# Patient Record
Sex: FEMALE | Race: WHITE | NOT HISPANIC OR LATINO | Employment: FULL TIME | URBAN - METROPOLITAN AREA
[De-identification: names, ages, dates, MRNs, and addresses within clinical notes are randomized per-mention and may not be internally consistent; named-entity substitution may affect disease eponyms.]

---

## 2025-04-14 ENCOUNTER — APPOINTMENT (OUTPATIENT)
Dept: CARDIOLOGY | Facility: HOSPITAL | Age: 20
End: 2025-04-14

## 2025-04-14 PROCEDURE — 99285 EMERGENCY DEPT VISIT HI MDM: CPT | Performed by: EMERGENCY MEDICINE

## 2025-04-14 PROCEDURE — 93005 ELECTROCARDIOGRAM TRACING: CPT

## 2025-04-14 ASSESSMENT — COLUMBIA-SUICIDE SEVERITY RATING SCALE - C-SSRS
6. HAVE YOU EVER DONE ANYTHING, STARTED TO DO ANYTHING, OR PREPARED TO DO ANYTHING TO END YOUR LIFE?: NO
2. HAVE YOU ACTUALLY HAD ANY THOUGHTS OF KILLING YOURSELF?: NO
1. IN THE PAST MONTH, HAVE YOU WISHED YOU WERE DEAD OR WISHED YOU COULD GO TO SLEEP AND NOT WAKE UP?: NO

## 2025-04-14 ASSESSMENT — PAIN SCALES - GENERAL: PAINLEVEL_OUTOF10: 8

## 2025-04-14 ASSESSMENT — PAIN - FUNCTIONAL ASSESSMENT: PAIN_FUNCTIONAL_ASSESSMENT: 0-10

## 2025-04-15 ENCOUNTER — APPOINTMENT (OUTPATIENT)
Dept: RADIOLOGY | Facility: HOSPITAL | Age: 20
End: 2025-04-15

## 2025-04-15 ENCOUNTER — APPOINTMENT (OUTPATIENT)
Dept: CARDIOLOGY | Facility: HOSPITAL | Age: 20
End: 2025-04-15

## 2025-04-15 ENCOUNTER — HOSPITAL ENCOUNTER (OUTPATIENT)
Facility: HOSPITAL | Age: 20
Setting detail: OBSERVATION
Discharge: HOME | End: 2025-04-15
Attending: EMERGENCY MEDICINE | Admitting: INTERNAL MEDICINE
Payer: MEDICAID

## 2025-04-15 VITALS
BODY MASS INDEX: 14.3 KG/M2 | DIASTOLIC BLOOD PRESSURE: 70 MMHG | WEIGHT: 89 LBS | SYSTOLIC BLOOD PRESSURE: 113 MMHG | HEIGHT: 66 IN | TEMPERATURE: 99.3 F | RESPIRATION RATE: 18 BRPM | OXYGEN SATURATION: 100 % | HEART RATE: 83 BPM

## 2025-04-15 DIAGNOSIS — J93.83 PNEUMOTHORAX, ACUTE: Primary | ICD-10-CM

## 2025-04-15 DIAGNOSIS — R07.1 CHEST PAIN ON BREATHING: ICD-10-CM

## 2025-04-15 LAB
ALBUMIN SERPL BCP-MCNC: 4.5 G/DL (ref 3.4–5)
ALP SERPL-CCNC: 86 U/L (ref 33–110)
ALT SERPL W P-5'-P-CCNC: 6 U/L (ref 7–45)
ANION GAP SERPL CALC-SCNC: 11 MMOL/L (ref 10–20)
AST SERPL W P-5'-P-CCNC: 11 U/L (ref 9–39)
B-HCG SERPL-ACNC: <2 MIU/ML
BASOPHILS # BLD AUTO: 0.07 X10*3/UL (ref 0–0.1)
BASOPHILS NFR BLD AUTO: 0.8 %
BILIRUB SERPL-MCNC: 0.4 MG/DL (ref 0–1.2)
BUN SERPL-MCNC: 10 MG/DL (ref 6–23)
CALCIUM SERPL-MCNC: 9.8 MG/DL (ref 8.6–10.3)
CHLORIDE SERPL-SCNC: 104 MMOL/L (ref 98–107)
CO2 SERPL-SCNC: 26 MMOL/L (ref 21–32)
CREAT SERPL-MCNC: 0.78 MG/DL (ref 0.5–1.05)
EGFRCR SERPLBLD CKD-EPI 2021: >90 ML/MIN/1.73M*2
EOSINOPHIL # BLD AUTO: 0.03 X10*3/UL (ref 0–0.7)
EOSINOPHIL NFR BLD AUTO: 0.3 %
ERYTHROCYTE [DISTWIDTH] IN BLOOD BY AUTOMATED COUNT: 12.3 % (ref 11.5–14.5)
GLUCOSE SERPL-MCNC: 96 MG/DL (ref 74–99)
HCT VFR BLD AUTO: 39.9 % (ref 36–46)
HGB BLD-MCNC: 13.6 G/DL (ref 12–16)
IMM GRANULOCYTES # BLD AUTO: 0.02 X10*3/UL (ref 0–0.7)
IMM GRANULOCYTES NFR BLD AUTO: 0.2 % (ref 0–0.9)
LYMPHOCYTES # BLD AUTO: 2.16 X10*3/UL (ref 1.2–4.8)
LYMPHOCYTES NFR BLD AUTO: 23.9 %
MCH RBC QN AUTO: 30.5 PG (ref 26–34)
MCHC RBC AUTO-ENTMCNC: 34.1 G/DL (ref 32–36)
MCV RBC AUTO: 90 FL (ref 80–100)
MONOCYTES # BLD AUTO: 0.78 X10*3/UL (ref 0.1–1)
MONOCYTES NFR BLD AUTO: 8.6 %
NEUTROPHILS # BLD AUTO: 5.98 X10*3/UL (ref 1.2–7.7)
NEUTROPHILS NFR BLD AUTO: 66.2 %
NRBC BLD-RTO: 0 /100 WBCS (ref 0–0)
PLATELET # BLD AUTO: 317 X10*3/UL (ref 150–450)
POTASSIUM SERPL-SCNC: 3.8 MMOL/L (ref 3.5–5.3)
PROT SERPL-MCNC: 7.4 G/DL (ref 6.4–8.2)
RBC # BLD AUTO: 4.46 X10*6/UL (ref 4–5.2)
SODIUM SERPL-SCNC: 137 MMOL/L (ref 136–145)
WBC # BLD AUTO: 9 X10*3/UL (ref 4.4–11.3)

## 2025-04-15 PROCEDURE — 1210000001 HC SEMI-PRIVATE ROOM DAILY

## 2025-04-15 PROCEDURE — 36415 COLL VENOUS BLD VENIPUNCTURE: CPT | Performed by: EMERGENCY MEDICINE

## 2025-04-15 PROCEDURE — 71045 X-RAY EXAM CHEST 1 VIEW: CPT

## 2025-04-15 PROCEDURE — 84702 CHORIONIC GONADOTROPIN TEST: CPT | Performed by: EMERGENCY MEDICINE

## 2025-04-15 PROCEDURE — 80053 COMPREHEN METABOLIC PANEL: CPT | Performed by: EMERGENCY MEDICINE

## 2025-04-15 PROCEDURE — 71275 CT ANGIOGRAPHY CHEST: CPT

## 2025-04-15 PROCEDURE — 2550000001 HC RX 255 CONTRASTS: Performed by: EMERGENCY MEDICINE

## 2025-04-15 PROCEDURE — 71046 X-RAY EXAM CHEST 2 VIEWS: CPT

## 2025-04-15 PROCEDURE — 71275 CT ANGIOGRAPHY CHEST: CPT | Mod: FOREIGN READ | Performed by: RADIOLOGY

## 2025-04-15 PROCEDURE — 71046 X-RAY EXAM CHEST 2 VIEWS: CPT | Mod: FOREIGN READ | Performed by: RADIOLOGY

## 2025-04-15 PROCEDURE — G0378 HOSPITAL OBSERVATION PER HR: HCPCS

## 2025-04-15 PROCEDURE — 93005 ELECTROCARDIOGRAM TRACING: CPT

## 2025-04-15 PROCEDURE — 99222 1ST HOSP IP/OBS MODERATE 55: CPT | Performed by: STUDENT IN AN ORGANIZED HEALTH CARE EDUCATION/TRAINING PROGRAM

## 2025-04-15 PROCEDURE — 85025 COMPLETE CBC W/AUTO DIFF WBC: CPT | Performed by: EMERGENCY MEDICINE

## 2025-04-15 PROCEDURE — 71045 X-RAY EXAM CHEST 1 VIEW: CPT | Performed by: RADIOLOGY

## 2025-04-15 RX ORDER — ONDANSETRON 4 MG/1
4 TABLET, FILM COATED ORAL EVERY 8 HOURS PRN
Status: DISCONTINUED | OUTPATIENT
Start: 2025-04-15 | End: 2025-04-15 | Stop reason: HOSPADM

## 2025-04-15 RX ORDER — ONDANSETRON HYDROCHLORIDE 2 MG/ML
4 INJECTION, SOLUTION INTRAVENOUS EVERY 8 HOURS PRN
Status: DISCONTINUED | OUTPATIENT
Start: 2025-04-15 | End: 2025-04-15 | Stop reason: HOSPADM

## 2025-04-15 RX ORDER — OXYCODONE HYDROCHLORIDE 5 MG/1
5 TABLET ORAL EVERY 6 HOURS PRN
Status: DISCONTINUED | OUTPATIENT
Start: 2025-04-15 | End: 2025-04-15 | Stop reason: HOSPADM

## 2025-04-15 RX ORDER — PANTOPRAZOLE SODIUM 40 MG/1
40 TABLET, DELAYED RELEASE ORAL
Status: DISCONTINUED | OUTPATIENT
Start: 2025-04-15 | End: 2025-04-15 | Stop reason: HOSPADM

## 2025-04-15 RX ORDER — ACETAMINOPHEN 325 MG/1
650 TABLET ORAL EVERY 4 HOURS PRN
Status: DISCONTINUED | OUTPATIENT
Start: 2025-04-15 | End: 2025-04-15 | Stop reason: HOSPADM

## 2025-04-15 RX ORDER — ACETAMINOPHEN 650 MG/1
650 SUPPOSITORY RECTAL EVERY 4 HOURS PRN
Status: DISCONTINUED | OUTPATIENT
Start: 2025-04-15 | End: 2025-04-15 | Stop reason: HOSPADM

## 2025-04-15 RX ORDER — PANTOPRAZOLE SODIUM 40 MG/10ML
40 INJECTION, POWDER, LYOPHILIZED, FOR SOLUTION INTRAVENOUS
Status: DISCONTINUED | OUTPATIENT
Start: 2025-04-15 | End: 2025-04-15 | Stop reason: HOSPADM

## 2025-04-15 RX ORDER — ACETAMINOPHEN 160 MG/5ML
650 SOLUTION ORAL EVERY 4 HOURS PRN
Status: DISCONTINUED | OUTPATIENT
Start: 2025-04-15 | End: 2025-04-15 | Stop reason: HOSPADM

## 2025-04-15 RX ADMIN — IOHEXOL 50 ML: 350 INJECTION, SOLUTION INTRAVENOUS at 04:29

## 2025-04-15 ASSESSMENT — ENCOUNTER SYMPTOMS
HEMATOLOGIC/LYMPHATIC NEGATIVE: 1
SHORTNESS OF BREATH: 1
ACTIVITY CHANGE: 1
NEUROLOGICAL NEGATIVE: 1
ALLERGIC/IMMUNOLOGIC NEGATIVE: 1
FATIGUE: 1
GASTROINTESTINAL NEGATIVE: 1
PSYCHIATRIC NEGATIVE: 1
MUSCULOSKELETAL NEGATIVE: 1
EYES NEGATIVE: 1
ENDOCRINE NEGATIVE: 1

## 2025-04-15 ASSESSMENT — ACTIVITIES OF DAILY LIVING (ADL): LACK_OF_TRANSPORTATION: NO

## 2025-04-15 ASSESSMENT — PAIN SCALES - GENERAL
PAINLEVEL_OUTOF10: 0 - NO PAIN

## 2025-04-15 NOTE — ED PROVIDER NOTES
Chief Complaint   Patient presents with   • Shortness of Breath   • Chest Pain   • lung problems     HPI:   Maria Eugenia Cuevas is an 19 y.o. female with no known past medical history presenting to the ED for chief complaint of right-sided chest pain.  She explains that she was driving in the car earlier today when she experienced sudden onset right sided chest pain in her upper chest.  She explains that the pain is worse when she sits up straight and is relieved when she hunches over.  Explains she has pain when she takes a deep breath.  Denies any trauma.  No cough.  No shortness of breath.  No fevers.    No Known Allergies:  No past medical history on file.  Past Surgical History:   Procedure Laterality Date   • MYRINGOTOMY W/ TUBES  10/17/2016    Myringotomy - With Ventilating Tube Insertion     No family history on file.     Physical Exam  Vitals and nursing note reviewed.   Constitutional:       General: She is not in acute distress.     Appearance: She is well-developed.   HENT:      Head: Normocephalic and atraumatic.      Mouth/Throat:      Mouth: Mucous membranes are moist.      Pharynx: Oropharynx is clear.   Eyes:      Extraocular Movements: Extraocular movements intact.      Conjunctiva/sclera: Conjunctivae normal.      Pupils: Pupils are equal, round, and reactive to light.   Cardiovascular:      Rate and Rhythm: Normal rate and regular rhythm.      Heart sounds: No murmur heard.  Pulmonary:      Effort: Pulmonary effort is normal. No respiratory distress.      Breath sounds: Examination of the right-upper field reveals decreased breath sounds. Examination of the right-middle field reveals decreased breath sounds. Decreased breath sounds present. No wheezing or rhonchi.   Abdominal:      Palpations: Abdomen is soft.      Tenderness: There is no abdominal tenderness.   Musculoskeletal:         General: No swelling.      Cervical back: Normal range of motion and neck supple.   Skin:     General: Skin is  warm and dry.      Capillary Refill: Capillary refill takes less than 2 seconds.   Neurological:      Mental Status: She is alert.   Psychiatric:         Mood and Affect: Mood normal.      VS: As documented in the triage note and EMR flowsheet from this visit were reviewed.      Medical Decision Making: This is a 19-year-old with no known past medical history presenting to the ED for chief complaint of chest pain.  Patient explains she was driving her car earlier today when she experienced sudden onset right-sided chest pain in the upper part of her chest.  Explains that it was hard to take a deep breath and her pain was severe.  On exam initially her vitals are stable she is saturating at 98% on room air she is afebrile she is speaking full sentences she is obviously uncomfortable.  She is very thin.  Decreased lung sounds on the right upper and mid lobe.  Lung sounds on the left were clear.  Heart rate and rhythm was regular.  Her chest x-ray was suggestive of an 10% apical pneumothorax on the right side.  CT order was placed to investigate for any blebs.  She was placed on 100% oxygen and admitted to the hospital for observation and cardiothoracic consult.  ED Course as of 04/15/25 0526   Tue Apr 15, 2025   0429 EKG interpreted by myself.  Normal sinus rhythm at a rate of 87 bpm.  Normal intervals.  Normal axis.  No signs of acute ischemia.   [EF]      ED Course User Index  [EF] Malcolm Haddad MD         Diagnoses as of 04/15/25 0526   Pneumothorax, acute   Chest pain on breathing     Counseling: Spoke with the patient and discussed today´s findings, in addition to providing specific details for the plan of care and expected course.  Patient was given the opportunity to ask questions.    The plan of care was mutually agreed upon with the patient. The patient and/or family were given the opportunity to ask questions. All questions asked today in the ED were answered to the best of my ability with today's  information.    This report was transcribed using voice recognition software.  Every effort was made to ensure accuracy, however, inadvertently computerized transcription errors may be present.       Sylvester Randolph PA-C  04/15/25 0405       Sylvester Randolph PA-C  04/15/25 0555

## 2025-04-15 NOTE — ED NOTES
Community Resource Name: Patient was given HRS service to assist with getting insurance.  Phone Number:   Staff Member:  Blanquita Morse    Discussed the following topics on behalf of the patient:  []  Behavioral Health Assistance     []  Case Management  []   Assistance  []  Digital Equity Assistance  []  Dental Health Assistance  []  Education Assistance  []  Employment Assistance  []  Financial Strain Relief Assistance  []  Food Insecurity Assistance  [x]  Healthcare Coverage Assistance  []  Housing Stability Assistance  []  IP Violence Relief Assistance  []  Legal Assistance  []  Physical Activity Assistance  []  Social Connection Assistance  []  Stress Relief Assistance   []  Substance Abuse Assistance  []  Transportation Assistance  []  Utility Assistance  [x]  Other: [insert comment here]  Patient has no insurance.  Next Steps:         MECHE Jackman  Track patients for community healthcare services. CHW was informed by [facility] that patient doesn't have insurance. CHW talked to patient about [facility] service that has various program that can help with medical bills, insurance or no insurance issues. CHW gave patient the HRS form to complete and expressed that someone from [facility] will follow-up. Patient expressed appreciation.        MECHE Jackman  04/15/25 [id number]

## 2025-04-15 NOTE — PROGRESS NOTES
Transitional Care Coordination Progress Note:  Plan per Medical/Surgical team: treatment of pneumothorax with oxygen, pain management, thorasic surgery consult  Status: Inpatient   Payor source: self pay- HRS for assistance   Discharge disposition: Home with foster mother  Potential Barriers: ?need chest tube   ADOD: 4/17/2025   CECILIO Peacock RN, BSN Transitional Care Coordinator ED# [phone]      04/15/25 0901   Discharge Planning   Living Arrangements Parent   Support Systems Parent   Assistance Needed oxygen, pain management, thorasic surgery consult, HRS for insurance assistance   Type of Residence Private residence   Number of Stairs to Enter Residence 3   Number of Stairs Within Residence 21   Home or Post Acute Services Community services   Expected Discharge Disposition Home   Does the patient need discharge transport arranged? No   Financial Resource Strain   How hard is it for you to pay for the very basics like food, housing, medical care, and heating? Somewhat   Housing Stability   In the last 12 months, was there a time when you were not able to pay the mortgage or rent on time? N   In the past 12 months, how many times have you moved where you were living? 1   At any time in the past 12 months, were you homeless or living in a shelter (including now)? N   Transportation Needs   In the past 12 months, has lack of transportation kept you from medical appointments or from getting medications? no   In the past 12 months, has lack of transportation kept you from meetings, work, or from getting things needed for daily living? No   Stroke Family Assessment   Stroke Family Assessment Needed No   Intensity of Service   Intensity of Service 0-30 min

## 2025-04-15 NOTE — PROGRESS NOTES
04/15/25 0901   ACS Disability Status   Are you deaf or do you have serious difficulty hearing? N   Are you blind or do you have serious difficulty seeing, even when wearing glasses? N   Because of a physical, mental, or emotional condition, do you have serious difficulty concentrating, remembering, or making decisions? (5 years old or older) N   Do you have serious difficulty walking or climbing stairs? N   Do you have serious difficulty dressing or bathing? N   Because of a physical, mental, or emotional condition, do you have serious difficulty doing errands alone such as visiting the doctor? N

## 2025-04-15 NOTE — ED TRIAGE NOTES
Pt to Ed with complaint of right sided chest pain that started while she was driving today. Pt went to urgent care where they sent her here to be examined for diminshed lung sounds. Pt states the pain is in her right upper chest that travels to right shoulder. Pt states it hurts to breathe. VSS. Pt is on RA. EKG completed in triage.

## 2025-04-15 NOTE — PROGRESS NOTES
Pharmacy Medication History     Source of Information: PATIENT    Additional concerns with the patient's PTA list.     Notified Provider via Haiku : No    The following updates were made to the Prior to Admission medication list:     Medications ADDED:   N/A  Medications CHANGED:  N/A  Medications REMOVED:   N/A  Medications NOT TAKING:   N/A    Allergy reviewed : Yes    Meds 2 Beds : Yes    Outpatient pharmacy confirmed and updated in chart : Yes    Pharmacy name:  [email or web address]    The list below reflectives the updated PTA list. Please review each medication in order reconciliation for additional clarification and justification.    None       The list below reflectives the updated allergy list. Please review each documented allergy for additional clarification and justification.    No Known Allergies       04/15/25 at 8:06 AM - Patsy Marcos

## 2025-04-15 NOTE — H&P
History Of Present Illness  Maria Eugenia Cuevas is a 19 y.o. female who presented to [facility] ER complaining of right-sided chest pain.  She was driving in her car earlier yesterday when she experienced sudden onset of right-sided chest pain.  The pain seemed to get worse if she sat up straight and relieved when she hunched over.  The pain is worse when she takes a deep breath.  No fever or chills.  She denies any trauma that may have instituted the pain.  CTA of the chest showed a right-sided pneumothorax of 7 to 10% as seen on the chest radiograph without definite significant interval change.  Etiology of the pneumothorax is unclear without evidence of emphysema or rib fracture identified.     Past Medical History  No past medical history on file.     Surgical History  Past Surgical History:   Procedure Laterality Date    MYRINGOTOMY W/ TUBES  10/17/2016    Myringotomy - With Ventilating Tube Insertion         Social History  She has no history on file for tobacco use, alcohol use, and drug use.    Family History  No family history on file.     Allergies  Patient has no known allergies.    Review of Systems   Constitutional:  Positive for activity change and fatigue.   HENT: Negative.     Eyes: Negative.    Respiratory:  Positive for shortness of breath.    Cardiovascular:  Positive for chest pain.   Gastrointestinal: Negative.    Endocrine: Negative.    Genitourinary: Negative.    Musculoskeletal: Negative.    Skin: Negative.    Allergic/Immunologic: Negative.    Neurological: Negative.    Hematological: Negative.    Psychiatric/Behavioral: Negative.     All other systems reviewed and are negative.       Physical Exam  Vitals and nursing note reviewed.   Constitutional:       Appearance: Normal appearance.   HENT:      Head: Normocephalic.      Right Ear: External ear normal.      Left Ear: External ear normal.      Nose: Nose normal.      Mouth/Throat:      Mouth: Mucous membranes are dry.       "Pharynx: Oropharynx is clear.   Eyes:      Extraocular Movements: Extraocular movements intact.      Conjunctiva/sclera: Conjunctivae normal.      Pupils: Pupils are equal, round, and reactive to light.   Cardiovascular:      Rate and Rhythm: Normal rate and regular rhythm.   Pulmonary:      Effort: Pulmonary effort is normal.      Breath sounds: Normal breath sounds.   Abdominal:      General: Abdomen is flat. Bowel sounds are normal.      Palpations: Abdomen is soft.   Musculoskeletal:         General: Normal range of motion.   Skin:     General: Skin is warm and dry.   Neurological:      General: No focal deficit present.      Mental Status: She is alert. Mental status is at baseline.   Psychiatric:         Mood and Affect: Mood normal.         Behavior: Behavior normal.          Last Recorded Vitals  Blood pressure 121/82, pulse 62, temperature 37.4 °C (99.3 °F), resp. rate 18, height 1.67 m (5' 5.75\"), weight (!) 40.4 kg (89 lb), SpO2 100%.    Relevant Results  Meds:  Scheduled medications  pantoprazole, 40 mg, oral, Daily before breakfast   Or  pantoprazole, 40 mg, intravenous, Daily before breakfast      Continuous medications     PRN medications  PRN medications: acetaminophen **OR** acetaminophen **OR** acetaminophen, ondansetron **OR** ondansetron, oxyCODONE, oxygen   No current outpatient medications     Labs:  Results for orders placed or performed during the hospital encounter of 04/15/25 (from the past 24 hours)   CBC and Auto Differential   Result Value Ref Range    WBC 9.0 4.4 - 11.3 x10*3/uL    nRBC 0.0 0.0 - 0.0 /100 WBCs    RBC 4.46 4.00 - 5.20 x10*6/uL    Hemoglobin 13.6 12.0 - 16.0 g/dL    Hematocrit 39.9 36.0 - 46.0 %    MCV 90 80 - 100 fL    MCH 30.5 26.0 - 34.0 pg    MCHC 34.1 32.0 - 36.0 g/dL    RDW 12.3 11.5 - 14.5 %    Platelets 317 150 - 450 x10*3/uL    Neutrophils % 66.2 40.0 - 80.0 %    Immature Granulocytes %, Automated 0.2 0.0 - 0.9 %    Lymphocytes % 23.9 13.0 - 44.0 %    Monocytes % " 8.6 2.0 - 10.0 %    Eosinophils % 0.3 0.0 - 6.0 %    Basophils % 0.8 0.0 - 2.0 %    Neutrophils Absolute 5.98 1.20 - 7.70 x10*3/uL    Immature Granulocytes Absolute, Automated 0.02 0.00 - 0.70 x10*3/uL    Lymphocytes Absolute 2.16 1.20 - 4.80 x10*3/uL    Monocytes Absolute 0.78 0.10 - 1.00 x10*3/uL    Eosinophils Absolute 0.03 0.00 - 0.70 x10*3/uL    Basophils Absolute 0.07 0.00 - 0.10 x10*3/uL   Comprehensive metabolic panel   Result Value Ref Range    Glucose 96 74 - 99 mg/dL    Sodium 137 136 - 145 mmol/L    Potassium 3.8 3.5 - 5.3 mmol/L    Chloride 104 98 - 107 mmol/L    Bicarbonate 26 21 - 32 mmol/L    Anion Gap 11 10 - 20 mmol/L    Urea Nitrogen 10 6 - 23 mg/dL    Creatinine 0.78 0.50 - 1.05 mg/dL    eGFR >90 >60 mL/min/1.73m*2    Calcium 9.8 8.6 - 10.3 mg/dL    Albumin 4.5 3.4 - 5.0 g/dL    Alkaline Phosphatase 86 33 - 110 U/L    Total Protein 7.4 6.4 - 8.2 g/dL    AST 11 9 - 39 U/L    Bilirubin, Total 0.4 0.0 - 1.2 mg/dL    ALT 6 (L) 7 - 45 U/L   Human Chorionic Gonadotropin, Serum Quantitative   Result Value Ref Range    HCG, Beta-Quantitative <2 <5 mIU/mL      Imaging:  Imaging  CT angio chest for pulmonary embolism    Result Date: 4/15/2025  Right-sided pneumothorax of 7-10% as seen on chest radiograph without definite significant interval change.  Etiology is unclear without evidence of emphysema or rib fracture identified. Signed by Cheikh Cheney    XR chest 2 views    Result Date: 4/15/2025  Right-sided apical pneumothorax of approximately 7-10%. NOTIFICATION:  The critical results of the study were discussed with, and acknowledged by PETE Bernstein, by telephone on 4/15/2025 at 2:18 AM. Signed by Cheikh Cheney     Cardiology, Vascular, and Other Imaging  No other imaging results found for the past 2 days       Assessment/Plan   Right-sided pneumothorax of 7 to 10% the etiology of which is unclear  Plan:  Thoracic surgery  Pulmonary  Pain control    I spent 60 minutes in the professional and overall  care of this patient.      Javan Weaver DO

## 2025-04-15 NOTE — CONSULTS
Reason For Consult  Right spontaneous PTX    History Of Present Illness  Maria Eugenia Cuevas is a 19 y.o. female presenting with acute onset of right sided chest pressure/pain found to have a small spontaneous pneumothorax. She has been treated conservatively thus far with O2 via NRB and does state she is feeling better. Very mild SOB.     She denies any h/o similar, not a smoker including vaping. She denies any recent falls or trauma. Not currently on her menstrual period. 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 Has flight planned for May.      Past Medical History  She has no past medical history on file.    Surgical History  She has a past surgical history that includes Myringotomy w/ tubes (10/17/2016).     Social History  She has no history on file for tobacco use, alcohol use, and drug use.    Family History  No family history on file.     Allergies  Patient has no known allergies.    Review of Systems  Constitutional: No fevers, chills, unexpected weight change  HENT: No sore throat, congestion, or nasal drainage  Eyes: No visual changes or eye itching  Respiratory:  see HPI. No cough, worsening dyspnea, wheezing  Cardiac: No chest pain, palpitations, or lower extremity edema  Gastrointestinal: No nausea, vomiting, diarrhea. No abdominal pain  Genitourinary: No dysuria or hematuria  Musculoskeletal: No back pain. No significant myalgias or arthralgias  Neurologic: No headaches, dizziness, or seizures.  Hematologic: No east bleeding or bruising.  Psychiatric: No anxiety or depression.       Physical Exam  Constitutional:       General: Patient is not in acute distress.     Appearance: Normal appearance; not ill-appearing.   HENT:      Head: Normocephalic.      Nose: No congestion or rhinorrhea.   Cardiovascular:      Rate and Rhythm: Normal rate and regular rhythm.      Pulses: Normal pulses.   Pulmonary:      Effort: Pulmonary effort is normal. No respiratory distress.  No conversational dyspnea      "Breath sounds: No stridor. No wheezing.   Abdominal:      General: There is no distension.      Palpations: Abdomen is soft.      Tenderness: There is no abdominal tenderness.   Musculoskeletal:         General: No swelling, tenderness or deformity. Normal range of motion.      Cervical back: Normal range of motion. No rigidity.   Lymphadenopathy:      Cervical: No cervical adenopathy.   Skin:     General: Skin is warm and dry.   Neurological:      General: No focal deficit present.      Mental Status: Patient is alert and oriented to person, place, and time.   Psychiatric:         Mood and Affect: Mood normal.        Last Recorded Vitals  Blood pressure 124/88, pulse 64, temperature 37.4 °C (99.3 °F), resp. rate 16, height 1.67 m (5' 5.75\"), weight (!) 40.4 kg (89 lb), SpO2 100%.    Relevant Results  CXR and CT chest personally reviewed     Assessment/Plan     Rt spontaneous ptx, first episode    Repeat CXR this AM overall stable ?minimal increase but appears to be primarily deeper inhalation. D/w patient chance of recurrence/no specific need for surgical intervention if PTX stays stable as this is her first episode.     -Plan for repeat CXR at 2pm, if stable can discharge home and see me next week in clinic at [facility] for short interval follow up (Tuesday April 22nd, 2025- CXR; 12:45 pm/ OV: 1:15 pm)      I spent 60 minutes in the professional and overall care of this patient.      Kiara Donald, DO    "

## 2025-04-16 LAB
ATRIAL RATE: 87 BPM
P AXIS: 79 DEGREES
P OFFSET: 193 MS
P ONSET: 152 MS
PR INTERVAL: 148 MS
Q ONSET: 226 MS
QRS COUNT: 15 BEATS
QRS DURATION: 68 MS
QT INTERVAL: 372 MS
QTC CALCULATION(BAZETT): 447 MS
QTC FREDERICIA: 420 MS
R AXIS: 91 DEGREES
T AXIS: 66 DEGREES
T OFFSET: 412 MS
VENTRICULAR RATE: 87 BPM

## 2025-04-16 NOTE — DISCHARGE SUMMARY
Discharge Diagnosis  Right spontaneous PTX     Issues Requiring Follow-Up  - OP follow up with thoracic surgery    Discharge Meds     Medication List      You have not been prescribed any medications.       Test Results Pending At Discharge  Pending Labs       No current pending labs.            Hospital Course  19 y.o. female who presented to [facility] ER complaining of right-sided chest pain.  She was driving in her car earlier yesterday when she experienced sudden onset of right-sided chest pain.  The pain seemed to get worse if she sat up straight and relieved when she hunched over.  The pain is worse when she takes a deep breath.  No fever or chills.  She denies any trauma that may have instituted the pain.  CTA of the chest showed a right-sided pneumothorax of 7 to 10% as seen on the chest radiograph without definite significant interval change. Thoracic surgery consulted and Ptx stable on serial CXR. Plan to dc home with OP follow up with Dr. Donald on 4/22.     Pertinent Physical Exam At Time of Discharge  Physical Exam  Vitals and nursing note reviewed.   HENT:      Mouth/Throat:      Mouth: Mucous membranes are moist.      Pharynx: Oropharynx is clear.   Cardiovascular:      Rate and Rhythm: Normal rate and regular rhythm.   Pulmonary:      Effort: Pulmonary effort is normal. No respiratory distress.   Abdominal:      Palpations: Abdomen is soft.   Neurological:      Mental Status: She is alert.         Outpatient Follow-Up  Future Appointments   Date Time Provider Department Center   4/22/2025  1:15 PM [id number]

## 2025-04-22 ENCOUNTER — OFFICE VISIT (OUTPATIENT)
Dept: SURGERY | Facility: CLINIC | Age: 20
End: 2025-04-22
Payer: MEDICAID

## 2025-04-22 ENCOUNTER — HOSPITAL ENCOUNTER (OUTPATIENT)
Dept: RADIOLOGY | Facility: CLINIC | Age: 20
Discharge: HOME | End: 2025-04-22
Payer: MEDICAID

## 2025-04-22 VITALS
TEMPERATURE: 98.7 F | OXYGEN SATURATION: 97 % | BODY MASS INDEX: 14.94 KG/M2 | WEIGHT: 93 LBS | HEART RATE: 95 BPM | DIASTOLIC BLOOD PRESSURE: 78 MMHG | SYSTOLIC BLOOD PRESSURE: 107 MMHG | HEIGHT: 66 IN

## 2025-04-22 DIAGNOSIS — J93.83 PNEUMOTHORAX, ACUTE: ICD-10-CM

## 2025-04-22 PROCEDURE — 71046 X-RAY EXAM CHEST 2 VIEWS: CPT

## 2025-04-22 PROCEDURE — 99213 OFFICE O/P EST LOW 20 MIN: CPT | Performed by: STUDENT IN AN ORGANIZED HEALTH CARE EDUCATION/TRAINING PROGRAM

## 2025-04-22 PROCEDURE — 3008F BODY MASS INDEX DOCD: CPT | Performed by: STUDENT IN AN ORGANIZED HEALTH CARE EDUCATION/TRAINING PROGRAM

## 2025-04-22 NOTE — PROGRESS NOTES
"Chief complaint  Post-hospitalization visit    History Of Present Illness  Maria Eugenia Cuevas is a 19 y.o. female presenting for their first post-hospitalization visit/short interval follow up after she presented to [facility] for spontaneous Rt pneumothorax on 4/15/25. Serial CXRs showed stability/slight improvement in the apical air space.     Patient has no h/o similar. She was not on menses. Is tall/thin. No known family h/o lung disease or CT disorder.      Past Medical History  She has no past medical history on file.    Surgical History  She has a past surgical history that includes Myringotomy w/ tubes (10/17/2016).     Social History  She has no history on file for tobacco use, alcohol use, and drug use.    Medications  Current Medications[1]    Review of Systems:  Review of Systems   Constitutional: No fevers, chills, unexpected weight change  HENT: No sore throat, congestion, or nasal drainage  Eyes: No visual changes or eye itching  Respiratory: see HPI. No cough, worsening dyspnea, wheezing  Cardiac: No chest pain, palpitations, or lower extremity edema  Gastrointestinal: No nausea, vomiting, diarrhea. No abdominal pain  Genitourinary: No dysuria or hematuria  Musculoskeletal: No back pain. No significant myalgias or arthralgias  Neurologic: No headaches, dizziness, or seizures.  Hematologic: No east bleeding or bruising.  Psychiatric: No anxiety or depression.    Physical Exam:  Physical Exam  /78   Pulse 95   Temp 37.1 °C (98.7 °F)   Ht 1.67 m (5' 5.75\")   Wt (!) 42.2 kg (93 lb)   SpO2 97%   BMI 15.12 kg/m²   Constitutional:       General: Patient is not in acute distress.     Appearance: Normal appearance; not ill-appearing.   HENT:      Head: Normocephalic.      Nose: No congestion or rhinorrhea.   Cardiovascular:      Rate and Rhythm: Normal rate and regular rhythm.      Pulses: Normal pulses.   Pulmonary:      Effort: Pulmonary effort is normal. No respiratory distress.  No conversational " dyspnea     Breath sounds: No stridor. No wheezing.   Abdominal:      General: There is no distension.      Palpations: Abdomen is soft.      Tenderness: There is no abdominal tenderness.   Musculoskeletal:         General: No swelling, tenderness or deformity. Normal range of motion.      Cervical back: Normal range of motion. No rigidity.   Lymphadenopathy:      Cervical: No cervical adenopathy.   Skin:     General: Skin is warm and dry.   Neurological:      General: No focal deficit present.      Mental Status: Patient is alert and oriented to person, place, and time.   Psychiatric:         Mood and Affect: Mood normal.     Relevant Results:    Pathology:  N/a    Imaging:  Imaging  No results found.    Cardiology, Vascular, and Other Imaging  No other imaging results found for the past 7 days       CXR personally reviewed     Assessment/Plan   Problem List Items Addressed This Visit           ICD-10-CM    Pneumothorax, acute J93.83    Relevant Orders    XR chest 2 views       Ms. Cuevas is a pleasant 19 year old female, tall/thin, who presents for short interval follow up after recent o/n stay at [facility] with her first spontaneous right pneumothorax. CXR today shows complete resolution. Return precautions given, she can call me with any recurrent sxs/questions/concerns. Otherwise can see me prn.        Kiara Donald, DO  Thoracic & Esophageal Surgery          [1] No current outpatient medications on file.